# Patient Record
Sex: MALE | Race: WHITE | Employment: STUDENT | URBAN - METROPOLITAN AREA
[De-identification: names, ages, dates, MRNs, and addresses within clinical notes are randomized per-mention and may not be internally consistent; named-entity substitution may affect disease eponyms.]

---

## 2018-09-19 ENCOUNTER — HOSPITAL ENCOUNTER (OUTPATIENT)
Dept: PHYSICAL THERAPY | Age: 18
Discharge: HOME | End: 2018-09-19
Payer: COMMERCIAL

## 2018-09-19 PROCEDURE — 97161 PT EVAL LOW COMPLEX 20 MIN: CPT

## 2018-09-19 NOTE — PROGRESS NOTES
Ambulatory/Rehab Services H2 Model Falls Risk Assessment    Risk Factor Pts. ·   Confusion/Disorientation/Impulsivity  []    4 ·   Symptomatic Depression  []   2 ·   Altered Elimination  []   1 ·   Dizziness/Vertigo  []   1 ·   Gender (Male)  [x]   1 ·   Any administered antiepileptics (anticonvulsants):  []   2 ·   Any administered benzodiazepines:  []   1 ·   Visual Impairment (specify):  []   1 ·   Portable Oxygen Use  []   1 ·   Orthostatic ? BP  []   1 ·   History of Recent Falls (within 3 mos.)  []   5     Ability to Rise from Chair (choose one) Pts. ·   Ability to rise in a single movement  [x]   0 ·   Pushes up, successful in one attempt  []   1 ·   Multiple attempts, but successful  []   3 ·   Unable to rise without assistance  []   4   Total: (5 or greater = High Risk) 1     Falls Prevention Plan:   []                Physical Limitations to Exercise (specify):   []                Mobility Assistance Device (type):   []                Exercise/Equipment Adaptation (specify):    ©2010 [facility] of [address] Patent #[id number].  Federal Law prohibits the replication, distribution or use without written permission from [facility] [employer]

## 2018-09-19 NOTE — THERAPY EVALUATION
Arron Rosario  : 2000  Primary: Divya Simmons Of [facility]*  Secondary:  Therapy Center at [address]  [phone]   [phone]        OUTPATIENT PHYSICAL THERAPY:Initial Assessment and Daily Note 2018   ICD-10: Treatment Diagnosis: Low back Pain [M54.5]; Cervicalgia M54.2]  Precautions/Allergies:   Amoxicillin   Fall Risk Score: 1 (? 5 = High Risk)  MD Orders: Evaluate and Treat MEDICAL/REFERRING DIAGNOSIS:  Low back pain [M54.5]   DATE OF ONSET: 2018  REFERRING PHYSICIAN: Jeannie Begum PA  RETURN PHYSICIAN APPOINTMENT: TBD     INITIAL ASSESSMENT:  Mr. Nasreen Neri presents with signs and symptoms of low back pain. Per the treatment based classification system for low back pain, pt. Is most closely associated with the stabilization/motor control category. Pt. Will benefit from core, gluteal, and lumbar strengthening exercises to address chronic back pain and return to previous level of ADL performance. Pt. Additionally c/o chronic neck pain, which has not bothered him recently. PROBLEM LIST (Impacting functional limitations):  1. Decreased Strength  2. Decreased ADL/Functional Activities  3. Increased Pain  4. Decreased Flexibility/Joint Mobility  5. Decreased [place] with Home Exercise Program INTERVENTIONS PLANNED:  1. Home Exercise Program (HEP)  2. Manual Therapy  3. Range of Motion (ROM)  4. Therapeutic Exercise/Strengthening   TREATMENT PLAN:  Effective Dates: 2018 TO 2018 (90 days). Frequency/Duration: 2 times a week for 90 Days  GOALS: (Goals have been discussed and agreed upon with patient.)  Discharge Goals: Time Frame: 8 weeks  1. Pt. Will demonstrate >90 degrees of lumbar flexion without pain to improve functional ROM. 2. Pt. Will sit for 30 minutes with < 2/10 LBP to address pain complaints. 3. Pt. Will score <10 % on the THEE to demonstrate improved pain and function.    Rehabilitation Potential For Stated Goals: Good              The information in this section was collected on 9/19/18 (except where otherwise noted). HISTORY:   History of Present Injury/Illness (Reason for Referral):  Pt. Attends PT c/o LBP with onset at the beginning of the year after starting a new job that required repetitive lifting. Pt. Notes the pain gradually increased over six months he worked the job until he decided to change jobs. Pt. Notes the pain is aggravated with prolonged sitting and lifting activities. The pain is improved with standing and walking. Pt. Denies radicular symptoms. Pt. Notes treatment by chiropractor that has not changed the pain. Past Medical History/Comorbidities:   Mr. Mar Bowens  has a past medical history of Asthma. Mr. Mar Bowens  has no past surgical history on file. Social History/Living Environment:     Lives in a two story home with family  Prior Level of Function/Work/Activity:  Functioned independently without limitations. Current Medications:       Current Outpatient Prescriptions:     albuterol (PROVENTIL VENTOLIN) 2.5 mg /3 mL (0.083 %) nebulizer solution, by Nebulization route once., Disp: , Rfl:     cetirizine (ZYRTEC) 10 mg tablet, Take 1 Tab by mouth daily. , Disp: 30 Tab, Rfl: 2   Date Last Reviewed:  9/19/2018   Number of Personal Factors/Comorbidities that affect the Plan of Care: 0: LOW COMPLEXITY   EXAMINATION:   OBSERVATION: increased thoracic kyphosis    Palpation/Joint Mobility:   Left Right   Lumbar Paraspinals Mild tenderness Mild tenderness   Quadratus Lumborum -- --   Gluteals -- --   Thoracic Spine hypomobility hypomobility   Lumbar Spine normal normal     Range of Motion: Lumbar degrees  Flexion 80 (pain)  Repeated Movement   Extension 40  Flex: --   Side Bend L:30 R: 30 Ext: --       Strength:   Left Right   Hip Extensors 4/5 4/5   Hip Abductors 4/5 5/5   Functional           Other:       Flexibility: Neuroscreen:  Quadriceps  -- Reflexes Sensation   Iliopsoas -- L2-4: 1+    Hamstrings limited S1-2: 2+    Gluteals -- Babinski: --    Triceps Surae --       Special Testing:   Left Right   Straight Leg Raise (-) (-)   Slump (-) (-)   Femoral Nerve      Aberrant Movements (-) (-)   Prone Instability Test (-) (-)   FADIR (-) (-)   LETY Stiles       SFMA: Multisegmental Lumbar Flexion DP, Multisegmental Extension DNP; Deep Squat DNP; Multisegmental rotation DNP, Cervical flexion FP; the rest FNP   Body Structures Involved:  1. Joints  2. Muscles Body Functions Affected:  1. Neuromusculoskeletal  2. Movement Related Activities and Participation Affected:  1. General Tasks and Demands  2. Mobility  3. Community, Social and [place]   Number of elements (examined above) that affect the Plan of Care: 1-2: LOW COMPLEXITY   CLINICAL PRESENTATION:   Presentation: Stable and uncomplicated: LOW COMPLEXITY   CLINICAL DECISION MAKING:   Outcome Measure: Tool Used: Modified Oswestry Low Back Pain Questionnaire  Score:  Initial: 9/50  Most Recent: X/50 (Date: -- )   Interpretation of Score: Each section is scored on a 0-5 scale, 5 representing the greatest disability. The scores of each section are added together for a total score of 50. Medical Necessity:   · Patient is expected to demonstrate progress in strength and range of motion to increase independence with sitting, driving and physical activity. Reason for Services/Other Comments:  · Pt. will benefit from skilled PT to address impairments identified through evaluation and return to previous level of ADL. Mariza Wynn    Use of outcome tool(s) and clinical judgement create a POC that gives a: Clear prediction of patient's progress: LOW COMPLEXITY            TREATMENT:   (In addition to Assessment/Re-Assessment sessions the following treatments were rendered)  Pre-treatment Symptoms/Complaints:  Low back pain  Pain: Initial:   Pain Intensity 1: 7 /10 Post Session:  6/10     No treatments was provided today, just evaluation. Pt. Is provided initial HEP. Date:  9/19/2018   Activity/Exercise Parameters   -- --                               [employer] Portal  Treatment/Session Assessment:    · Response to Treatment: TBD  · Compliance with Program/Exercises: Will assess as treatment progresses. · Recommendations/Intent for next treatment session: \"Next visit will focus on advancements to more challenging activities\".   Total Treatment Duration: 60 minutes evaluation  PT Patient Time In/Time Out  Time In: 0930  Time Out: [address] [place] [address] SUE Ramirez

## 2018-09-26 ENCOUNTER — HOSPITAL ENCOUNTER (OUTPATIENT)
Dept: PHYSICAL THERAPY | Age: 18
Discharge: HOME | End: 2018-09-26
Payer: COMMERCIAL

## 2018-09-26 PROCEDURE — 97110 THERAPEUTIC EXERCISES: CPT

## 2018-09-26 PROCEDURE — 97140 MANUAL THERAPY 1/> REGIONS: CPT

## 2018-09-26 NOTE — PROGRESS NOTES
Maria Guadalupe Hidden  : 2000  Primary: Saeed Litten Of [facility]*  Secondary:  Therapy Center at [address]  [phone]   [phone]        OUTPATIENT PHYSICAL THERAPY:Daily Note 2018   ICD-10: Treatment Diagnosis: Low back Pain [M54.5]; Cervicalgia M54.2]  Precautions/Allergies:   Amoxicillin   Fall Risk Score: 1 (? 5 = High Risk)  MD Orders: Evaluate and Treat MEDICAL/REFERRING DIAGNOSIS:  Low back pain [M54.5]   DATE OF ONSET: 2018  REFERRING PHYSICIAN: Jeannie Begum PA  RETURN PHYSICIAN APPOINTMENT: TBD     INITIAL ASSESSMENT:  Mr. Arely Wei presents with signs and symptoms of low back pain. Per the treatment based classification system for low back pain, pt. Is most closely associated with the stabilization/motor control category. Pt. Will benefit from core, gluteal, and lumbar strengthening exercises to address chronic back pain and return to previous level of ADL performance. Pt. Additionally c/o chronic neck pain, which has not bothered him recently. PROBLEM LIST (Impacting functional limitations):  1. Decreased Strength  2. Decreased ADL/Functional Activities  3. Increased Pain  4. Decreased Flexibility/Joint Mobility  5. Decreased [place] with Home Exercise Program INTERVENTIONS PLANNED:  1. Home Exercise Program (HEP)  2. Manual Therapy  3. Range of Motion (ROM)  4. Therapeutic Exercise/Strengthening   TREATMENT PLAN:  Effective Dates: 2018 TO 2018 (90 days). Frequency/Duration: 2 times a week for 90 Days  GOALS: (Goals have been discussed and agreed upon with patient.)  Discharge Goals: Time Frame: 8 weeks  1. Pt. Will demonstrate >90 degrees of lumbar flexion without pain to improve functional ROM. 2. Pt. Will sit for 30 minutes with < 2/10 LBP to address pain complaints. 3. Pt. Will score <10 % on the THEE to demonstrate improved pain and function.    Rehabilitation Potential For Stated Goals: Good              The information in this section was collected on 9/19/18 (except where otherwise noted). HISTORY:   History of Present Injury/Illness (Reason for Referral):  Pt. Attends PT c/o LBP with onset at the beginning of the year after starting a new job that required repetitive lifting. Pt. Notes the pain gradually increased over six months he worked the job until he decided to change jobs. Pt. Notes the pain is aggravated with prolonged sitting and lifting activities. The pain is improved with standing and walking. Pt. Denies radicular symptoms. Pt. Notes treatment by chiropractor that has not changed the pain. Past Medical History/Comorbidities:   Mr. Vivian Chaudhry  has a past medical history of Asthma. Mr. Vivian Chaudhry  has no past surgical history on file. Social History/Living Environment:     Lives in a two story home with family  Prior Level of Function/Work/Activity:  Functioned independently without limitations. Current Medications:       Current Outpatient Prescriptions:     albuterol (PROVENTIL VENTOLIN) 2.5 mg /3 mL (0.083 %) nebulizer solution, by Nebulization route once., Disp: , Rfl:     cetirizine (ZYRTEC) 10 mg tablet, Take 1 Tab by mouth daily. , Disp: 30 Tab, Rfl: 2   Date Last Reviewed:  9/26/2018   Number of Personal Factors/Comorbidities that affect the Plan of Care: 0: LOW COMPLEXITY   EXAMINATION:   OBSERVATION: increased thoracic kyphosis    Palpation/Joint Mobility:   Left Right   Lumbar Paraspinals Mild tenderness Mild tenderness   Quadratus Lumborum -- --   Gluteals -- --   Thoracic Spine hypomobility hypomobility   Lumbar Spine normal normal     Range of Motion: Lumbar degrees  Flexion 80 (pain)  Repeated Movement   Extension 40  Flex: --   Side Bend L:30 R: 30 Ext: --       Strength:   Left Right   Hip Extensors 4/5 4/5   Hip Abductors 4/5 5/5   Functional           Other:       Flexibility: Neuroscreen:  Quadriceps  -- Reflexes Sensation   Iliopsoas -- L2-4: 1+    Hamstrings limited S1-2: 2+    Gluteals -- Babinski: --    Triceps Surae --       Special Testing:   Left Right   Straight Leg Raise (-) (-)   Slump (-) (-)   Femoral Nerve      Aberrant Movements (-) (-)   Prone Instability Test (-) (-)   FADIR (-) (-)   LETY Stiles       SFMA: Multisegmental Lumbar Flexion DP, Multisegmental Extension DNP; Deep Squat DNP; Multisegmental rotation DNP, Cervical flexion FP; the rest FNP   Body Structures Involved:  1. Joints  2. Muscles Body Functions Affected:  1. Neuromusculoskeletal  2. Movement Related Activities and Participation Affected:  1. General Tasks and Demands  2. Mobility  3. Community, Social and [place]   Number of elements (examined above) that affect the Plan of Care: 1-2: LOW COMPLEXITY   CLINICAL PRESENTATION:   Presentation: Stable and uncomplicated: LOW COMPLEXITY   CLINICAL DECISION MAKING:   Outcome Measure: Tool Used: Modified Oswestry Low Back Pain Questionnaire  Score:  Initial: 9/50  Most Recent: X/50 (Date: -- )   Interpretation of Score: Each section is scored on a 0-5 scale, 5 representing the greatest disability. The scores of each section are added together for a total score of 50. Medical Necessity:   · Patient is expected to demonstrate progress in strength and range of motion to increase independence with sitting, driving and physical activity. Reason for Services/Other Comments:  · Pt. will benefit from skilled PT to address impairments identified through evaluation and return to previous level of ADL. Katherene Means Use of outcome tool(s) and clinical judgement create a POC that gives a: Clear prediction of patient's progress: LOW COMPLEXITY            TREATMENT:   (In addition to Assessment/Re-Assessment sessions the following treatments were rendered)  Pre-treatment Symptoms/Complaints:  Pt. Notes improved LBP over the past week and good compliance with HEP. Pain: Initial:   Pain Intensity 1: 5 /10 Post Session:  6/10     Therapeutic Exercise: (50 Minutes):  Exercises per grid below to improve mobility and strength. Required minimal verbal and manual cues to promote proper body alignment and promote proper body mechanics. Progressed repetitions as indicated. Date:  9/26/2018   Activity/Exercise Parameters   Rolling Pattern: supine-prone 8 minutes   Bird Dogs 3 x 20   Bridge progression 5 minutes   clams 4 minutes   Sidelying hip abduction 3 x 10   Half dead Bug (LE only) 6 minutes   Half kneeling with chops 8 minutes   Lateral walking (red band) 4 minutes   Push up with core control 3 x 10   Manual Therapy (    Soft Tissue Mobilization Duration  Duration: 10 Minutes): Manual techniques to facilitate improved motion and decreased pain. (Used abbreviations: MET - muscle energy technique; PNF - proprioceptive neuromuscular facilitation; NMR - neuromuscular re-education; a/p - anterior to posterior; p/a - posterior to anterior)   · Soft tissue mobilization: B hamstrings. [facility] Portal  Treatment/Session Assessment:    · Response to Treatment: Pt. Demonstrates improved multisegmental lumbar flexion today without increased hamstring tightness. Pt. Is initiated on core, gluteal, and hip strengthening exercises without complaints. · Compliance with Program/Exercises: Will assess as treatment progresses. · Recommendations/Intent for next treatment session: \"Next visit will focus on advancements to more challenging activities\". Total Treatment Duration: 50 minutes therapeutic exercise, 10 minutes manual therapy.    PT Patient Time In/Time Out  Time In: 1130  Time Out: [facility] [facility]

## 2018-09-28 ENCOUNTER — APPOINTMENT (OUTPATIENT)
Dept: PHYSICAL THERAPY | Age: 18
End: 2018-09-28
Payer: COMMERCIAL

## 2018-10-01 ENCOUNTER — HOSPITAL ENCOUNTER (OUTPATIENT)
Dept: PHYSICAL THERAPY | Age: 18
Discharge: HOME | End: 2018-10-01
Payer: COMMERCIAL

## 2018-10-01 PROCEDURE — 97110 THERAPEUTIC EXERCISES: CPT

## 2018-10-01 NOTE — PROGRESS NOTES
Kris Hayes  : 2000  Primary: Eli Cali Of [facility]*  Secondary:  Therapy Center at [address], [address] [place] [address]  [phone]   [phone]        OUTPATIENT PHYSICAL THERAPY:Daily Note 10/1/2018   ICD-10: Treatment Diagnosis: Low back Pain [M54.5]; Cervicalgia M54.2]  Precautions/Allergies:   Amoxicillin   Fall Risk Score: 1 (? 5 = High Risk)  MD Orders: Evaluate and Treat MEDICAL/REFERRING DIAGNOSIS:  Low back pain [M54.5]   DATE OF ONSET: 2018  REFERRING PHYSICIAN: Jeannie Begum PA  RETURN PHYSICIAN APPOINTMENT: TBD     INITIAL ASSESSMENT:  Mr. Naz Whitt presents with signs and symptoms of low back pain. Per the treatment based classification system for low back pain, pt. Is most closely associated with the stabilization/motor control category. Pt. Will benefit from core, gluteal, and lumbar strengthening exercises to address chronic back pain and return to previous level of ADL performance. Pt. Additionally c/o chronic neck pain, which has not bothered him recently. PROBLEM LIST (Impacting functional limitations):  1. Decreased Strength  2. Decreased ADL/Functional Activities  3. Increased Pain  4. Decreased Flexibility/Joint Mobility  5. Decreased [place] with Home Exercise Program INTERVENTIONS PLANNED:  1. Home Exercise Program (HEP)  2. Manual Therapy  3. Range of Motion (ROM)  4. Therapeutic Exercise/Strengthening   TREATMENT PLAN:  Effective Dates: 2018 TO 2018 (90 days). Frequency/Duration: 2 times a week for 90 Days  GOALS: (Goals have been discussed and agreed upon with patient.)  Discharge Goals: Time Frame: 8 weeks  1. Pt. Will demonstrate >90 degrees of lumbar flexion without pain to improve functional ROM. 2. Pt. Will sit for 30 minutes with < 2/10 LBP to address pain complaints. 3. Pt. Will score <10 % on the THEE to demonstrate improved pain and function.    Rehabilitation Potential For Stated Goals: Good              The information in this section was collected on 9/19/18 (except where otherwise noted). HISTORY:   History of Present Injury/Illness (Reason for Referral):  Pt. Attends PT c/o LBP with onset at the beginning of the year after starting a new job that required repetitive lifting. Pt. Notes the pain gradually increased over six months he worked the job until he decided to change jobs. Pt. Notes the pain is aggravated with prolonged sitting and lifting activities. The pain is improved with standing and walking. Pt. Denies radicular symptoms. Pt. Notes treatment by chiropractor that has not changed the pain. Past Medical History/Comorbidities:   Mr. Margie Cardozo  has a past medical history of Asthma. Mr. Margie Cardozo  has no past surgical history on file. Social History/Living Environment:     Lives in a two story home with family  Prior Level of Function/Work/Activity:  Functioned independently without limitations. Current Medications:       Current Outpatient Prescriptions:     albuterol (PROVENTIL VENTOLIN) 2.5 mg /3 mL (0.083 %) nebulizer solution, by Nebulization route once., Disp: , Rfl:     cetirizine (ZYRTEC) 10 mg tablet, Take 1 Tab by mouth daily. , Disp: 30 Tab, Rfl: 2   Date Last Reviewed:  10/1/2018   Number of Personal Factors/Comorbidities that affect the Plan of Care: 0: LOW COMPLEXITY   EXAMINATION:   OBSERVATION: increased thoracic kyphosis    Palpation/Joint Mobility:   Left Right   Lumbar Paraspinals Mild tenderness Mild tenderness   Quadratus Lumborum -- --   Gluteals -- --   Thoracic Spine hypomobility hypomobility   Lumbar Spine normal normal     Range of Motion: Lumbar degrees  Flexion 80 (pain)  Repeated Movement   Extension 40  Flex: --   Side Bend L:30 R: 30 Ext: --       Strength:   Left Right   Hip Extensors 4/5 4/5   Hip Abductors 4/5 5/5   Functional           Other:       Flexibility: Neuroscreen:  Quadriceps  -- Reflexes Sensation   Iliopsoas -- L2-4: 1+    Hamstrings limited S1-2: 2+    Gluteals -- Babinski: --    Triceps Surae --       Special Testing:   Left Right   Straight Leg Raise (-) (-)   Slump (-) (-)   Femoral Nerve      Aberrant Movements (-) (-)   Prone Instability Test (-) (-)   FADIR (-) (-)   LETY Stiles       SFMA: Multisegmental Lumbar Flexion DP, Multisegmental Extension DNP; Deep Squat DNP; Multisegmental rotation DNP, Cervical flexion FP; the rest FNP   Body Structures Involved:  1. Joints  2. Muscles Body Functions Affected:  1. Neuromusculoskeletal  2. Movement Related Activities and Participation Affected:  1. General Tasks and Demands  2. Mobility  3. Community, Social and [address]   Number of elements (examined above) that affect the Plan of Care: 1-2: LOW COMPLEXITY   CLINICAL PRESENTATION:   Presentation: Stable and uncomplicated: LOW COMPLEXITY   CLINICAL DECISION MAKING:   Outcome Measure: Tool Used: Modified Oswestry Low Back Pain Questionnaire  Score:  Initial: 9/50  Most Recent: X/50 (Date: -- )   Interpretation of Score: Each section is scored on a 0-5 scale, 5 representing the greatest disability. The scores of each section are added together for a total score of 50. Medical Necessity:   · Patient is expected to demonstrate progress in strength and range of motion to increase independence with sitting, driving and physical activity. Reason for Services/Other Comments:  · Pt. will benefit from skilled PT to address impairments identified through evaluation and return to previous level of ADL. Murali Farley Use of outcome tool(s) and clinical judgement create a POC that gives a: Clear prediction of patient's progress: LOW COMPLEXITY            TREATMENT:   (In addition to Assessment/Re-Assessment sessions the following treatments were rendered)  Pre-treatment Symptoms/Complaints:  Pt. Reports absence of flare up of LBP.   Pt. Notes he was able to hike with a large back pack on for a quarter of a mile without LBP. Pain: Initial:   Pain Intensity 1: 4 /10 Post Session:  4/10     Therapeutic Exercise: (55 Minutes):  Exercises per grid below to improve mobility and strength. Required minimal verbal and manual cues to promote proper body alignment and promote proper body mechanics. Progressed repetitions as indicated. Date:  10/1/2018   Activity/Exercise Parameters   Rolling Pattern: supine-prone 8 minutes   Bird Dogs 3 x 20   Bridge progression --   clams 4 minutes   Sidelying hip abduction 3 x 10   Half dead Bug (LE only) 6 minutes   Half kneeling with chops 8 minutes   Lateral walking (red band) 4 minutes   Push up with core control 3 x 10   Deep squat 3 x 10   lunges 3 x 10   Side bridge for repetitions (knees bent) 3 x 10       [facility] Portal  Treatment/Session Assessment:    · Response to Treatment: Pt. Continues with motor control and strength progression demonstrating improved core and gluteal control. Mild cueing remains for appropriate rolling patterns. Pt. Will benefit from continued progression of current program.    · Compliance with Program/Exercises: good compliance. · Recommendations/Intent for next treatment session: \"Next visit will focus on advancements to more challenging activities\".   Total Treatment Duration: 55 minutes therapeutic exercise   PT Patient Time In/Time Out  Time In: 1130  Time Out: [address] Mike [address]ke[address]

## 2018-10-08 ENCOUNTER — HOSPITAL ENCOUNTER (OUTPATIENT)
Dept: PHYSICAL THERAPY | Age: 18
Discharge: HOME | End: 2018-10-08
Payer: COMMERCIAL

## 2018-10-08 PROCEDURE — 97110 THERAPEUTIC EXERCISES: CPT

## 2018-10-08 NOTE — PROGRESS NOTES
Sandip Staley  : 2000  Primary: [facility]*  Secondary:  Therapy Center at [address], [address] [place] [address]  [phone]   [phone]        OUTPATIENT PHYSICAL THERAPY:Daily Note 10/8/2018   ICD-10: Treatment Diagnosis: Low back Pain [M54.5]; Cervicalgia M54.2]  Precautions/Allergies:   Amoxicillin   Fall Risk Score: 1 (? 5 = High Risk)  MD Orders: Evaluate and Treat MEDICAL/REFERRING DIAGNOSIS:  Low back pain [M54.5]   DATE OF ONSET: 2018  REFERRING PHYSICIAN: Jeannie Begum PA  RETURN PHYSICIAN APPOINTMENT: TBD     INITIAL ASSESSMENT:  Mr. Giovanna Ro presents with signs and symptoms of low back pain. Per the treatment based classification system for low back pain, pt. Is most closely associated with the stabilization/motor control category. Pt. Will benefit from core, gluteal, and lumbar strengthening exercises to address chronic back pain and return to previous level of ADL performance. Pt. Additionally c/o chronic neck pain, which has not bothered him recently. PROBLEM LIST (Impacting functional limitations):  1. Decreased Strength  2. Decreased ADL/Functional Activities  3. Increased Pain  4. Decreased Flexibility/Joint Mobility  5. Decreased [place] with Home Exercise Program INTERVENTIONS PLANNED:  1. Home Exercise Program (HEP)  2. Manual Therapy  3. Range of Motion (ROM)  4. Therapeutic Exercise/Strengthening   TREATMENT PLAN:  Effective Dates: 2018 TO 2018 (90 days). Frequency/Duration: 2 times a week for 90 Days  GOALS: (Goals have been discussed and agreed upon with patient.)  Discharge Goals: Time Frame: 8 weeks  1. Pt. Will demonstrate >90 degrees of lumbar flexion without pain to improve functional ROM. 2. Pt. Will sit for 30 minutes with < 2/10 LBP to address pain complaints. 3. Pt. Will score <10 % on the THEE to demonstrate improved pain and function.    Rehabilitation Potential For Stated Goals: Good              The information in this section was collected on 9/19/18 (except where otherwise noted). HISTORY:   History of Present Injury/Illness (Reason for Referral):  Pt. Attends PT c/o LBP with onset at the beginning of the year after starting a new job that required repetitive lifting. Pt. Notes the pain gradually increased over six months he worked the job until he decided to change jobs. Pt. Notes the pain is aggravated with prolonged sitting and lifting activities. The pain is improved with standing and walking. Pt. Denies radicular symptoms. Pt. Notes treatment by chiropractor that has not changed the pain. Past Medical History/Comorbidities:   Mr. Jurgen Walls  has a past medical history of Asthma. Mr. Jurgen Walls  has no past surgical history on file. Social History/Living Environment:     Lives in a two story home with family  Prior Level of Function/Work/Activity:  Functioned independently without limitations. Current Medications:       Current Outpatient Prescriptions:     albuterol (PROVENTIL VENTOLIN) 2.5 mg /3 mL (0.083 %) nebulizer solution, by Nebulization route once., Disp: , Rfl:     cetirizine (ZYRTEC) 10 mg tablet, Take 1 Tab by mouth daily. , Disp: 30 Tab, Rfl: 2   Date Last Reviewed:  10/8/2018   Number of Personal Factors/Comorbidities that affect the Plan of Care: 0: LOW COMPLEXITY   EXAMINATION:   OBSERVATION: increased thoracic kyphosis    Palpation/Joint Mobility:   Left Right   Lumbar Paraspinals Mild tenderness Mild tenderness   Quadratus Lumborum -- --   Gluteals -- --   Thoracic Spine hypomobility hypomobility   Lumbar Spine normal normal     Range of Motion: Lumbar degrees  Flexion 80 (pain)  Repeated Movement   Extension 40  Flex: --   Side Bend L:30 R: 30 Ext: --       Strength:   Left Right   Hip Extensors 4/5 4/5   Hip Abductors 4/5 5/5   Functional           Other:       Flexibility: Neuroscreen:  Quadriceps  -- Reflexes Sensation   Iliopsoas -- L2-4: 1+    Hamstrings limited S1-2: 2+    Gluteals -- Babinski: --    Triceps Surae --       Special Testing:   Left Right   Straight Leg Raise (-) (-)   Slump (-) (-)   Femoral Nerve      Aberrant Movements (-) (-)   Prone Instability Test (-) (-)   FADIR (-) (-)   LETY Stiles       SFMA: Multisegmental Lumbar Flexion DP, Multisegmental Extension DNP; Deep Squat DNP; Multisegmental rotation DNP, Cervical flexion FP; the rest FNP   Body Structures Involved:  1. Joints  2. Muscles Body Functions Affected:  1. Neuromusculoskeletal  2. Movement Related Activities and Participation Affected:  1. General Tasks and Demands  2. Mobility  3. Community, Social and [place]   Number of elements (examined above) that affect the Plan of Care: 1-2: LOW COMPLEXITY   CLINICAL PRESENTATION:   Presentation: Stable and uncomplicated: LOW COMPLEXITY   CLINICAL DECISION MAKING:   Outcome Measure: Tool Used: Modified Oswestry Low Back Pain Questionnaire  Score:  Initial: 9/50  Most Recent: X/50 (Date: -- )   Interpretation of Score: Each section is scored on a 0-5 scale, 5 representing the greatest disability. The scores of each section are added together for a total score of 50. Medical Necessity:   · Patient is expected to demonstrate progress in strength and range of motion to increase independence with sitting, driving and physical activity. Reason for Services/Other Comments:  · Pt. will benefit from skilled PT to address impairments identified through evaluation and return to previous level of ADL. Wendy Tripathi Use of outcome tool(s) and clinical judgement create a POC that gives a: Clear prediction of patient's progress: LOW COMPLEXITY            TREATMENT:   (In addition to Assessment/Re-Assessment sessions the following treatments were rendered)  Pre-treatment Symptoms/Complaints:  Pt. Notes he was able to return to the gym last week.   Pain remains minimal. Pain: Initial:   Pain Intensity 1: 3 /10 Post Session:  3/10     Therapeutic Exercise: (55 Minutes):  Exercises per grid below to improve mobility and strength. Required minimal verbal and manual cues to promote proper body alignment and promote proper body mechanics. Progressed repetitions as indicated. Date:  10/8/2018   Activity/Exercise Parameters   Rolling Pattern: supine-prone 6 minutes   Bird Dogs on BOSU 3 x 20   Bridge progression 5 minutes   clams --   Sidelying hip abduction 3 x 10   Dead Bug  6 minutes   Half kneeling with chops --   Lateral walking (red band) 4 minutes   Push up with core control 3 x 10   Deep squat 3 x 10   lunges --   Side bridge for repetitions (knees bent) 3 x 10   Curls ups on stability ball 3 x 20   Single leg squat 3 x 10   Manual Therapy (    Soft Tissue Mobilization Duration  Duration: 5 Minutes): Manual techniques to facilitate improved motion and decreased pain. (Used abbreviations: MET - muscle energy technique; PNF - proprioceptive neuromuscular facilitation; NMR - neuromuscular re-education; a/p - anterior to posterior; p/a - posterior to anterior)   · Joint mobilization: thoracic manipulation in supine, grade V a/p      Med[employer] Portal  Treatment/Session Assessment:    · Response to Treatment: Pt. Requires minimal cueing for rolling patterns today and completes bird dogs without corrections indicating improved core and hip motor control. Pt. Will benefit from continued advancement of program.     · Compliance with Program/Exercises: good compliance. · Recommendations/Intent for next treatment session: \"Next visit will focus on advancements to more challenging activities\". Total Treatment Duration: 55 minutes therapeutic exercise, 5 minutes manual therapy, 60 minutes total time.     PT Patient Time In/Time Out  Time In: 1130  Time Out: [address] [facility]

## 2018-10-15 ENCOUNTER — HOSPITAL ENCOUNTER (OUTPATIENT)
Dept: PHYSICAL THERAPY | Age: 18
Discharge: HOME | End: 2018-10-15
Payer: COMMERCIAL

## 2018-10-15 PROCEDURE — 97110 THERAPEUTIC EXERCISES: CPT

## 2018-10-15 NOTE — PROGRESS NOTES
Jessie Krishnamurthy  : 2000  Primary: [facility]*  Secondary:  Therapy Center at [address]  [address] [place] [address], [address] [place] [address]  [phone]   [phone]        OUTPATIENT PHYSICAL THERAPY:Daily Note 10/15/2018   ICD-10: Treatment Diagnosis: Low back Pain [M54.5]; Cervicalgia M54.2]  Precautions/Allergies:   Amoxicillin   Fall Risk Score: 1 (? 5 = High Risk)  MD Orders: Evaluate and Treat MEDICAL/REFERRING DIAGNOSIS:  Low back pain [M54.5]   DATE OF ONSET: 2018  REFERRING PHYSICIAN: Jeannie Begum PA  RETURN PHYSICIAN APPOINTMENT: TBD     INITIAL ASSESSMENT:  Mr. Petersen Nurse presents with signs and symptoms of low back pain. Per the treatment based classification system for low back pain, pt. Is most closely associated with the stabilization/motor control category. Pt. Will benefit from core, gluteal, and lumbar strengthening exercises to address chronic back pain and return to previous level of ADL performance. Pt. Additionally c/o chronic neck pain, which has not bothered him recently. PROBLEM LIST (Impacting functional limitations):  1. Decreased Strength  2. Decreased ADL/Functional Activities  3. Increased Pain  4. Decreased Flexibility/Joint Mobility  5. Decreased [place] with Home Exercise Program INTERVENTIONS PLANNED:  1. Home Exercise Program (HEP)  2. Manual Therapy  3. Range of Motion (ROM)  4. Therapeutic Exercise/Strengthening   TREATMENT PLAN:  Effective Dates: 2018 TO 2018 (90 days). Frequency/Duration: 2 times a week for 90 Days  GOALS: (Goals have been discussed and agreed upon with patient.)  Discharge Goals: Time Frame: 8 weeks  1. Pt. Will demonstrate >90 degrees of lumbar flexion without pain to improve functional ROM. 2. Pt. Will sit for 30 minutes with < 2/10 LBP to address pain complaints. 3. Pt. Will score <10 % on the THEE to demonstrate improved pain and function.    Rehabilitation Potential For Stated Goals: Good              The information in this section was collected on 9/19/18 (except where otherwise noted). HISTORY:   History of Present Injury/Illness (Reason for Referral):  Pt. Attends PT c/o LBP with onset at the beginning of the year after starting a new job that required repetitive lifting. Pt. Notes the pain gradually increased over six months he worked the job until he decided to change jobs. Pt. Notes the pain is aggravated with prolonged sitting and lifting activities. The pain is improved with standing and walking. Pt. Denies radicular symptoms. Pt. Notes treatment by chiropractor that has not changed the pain. Past Medical History/Comorbidities:   Mr. Piper Gonzalez  has a past medical history of Asthma. Mr. Piper Gonzalez  has no past surgical history on file. Social History/Living Environment:     Lives in a two story home with family  Prior Level of Function/Work/Activity:  Functioned independently without limitations. Current Medications:       Current Outpatient Prescriptions:     albuterol (PROVENTIL VENTOLIN) 2.5 mg /3 mL (0.083 %) nebulizer solution, by Nebulization route once., Disp: , Rfl:     cetirizine (ZYRTEC) 10 mg tablet, Take 1 Tab by mouth daily. , Disp: 30 Tab, Rfl: 2   Date Last Reviewed:  10/15/2018   Number of Personal Factors/Comorbidities that affect the Plan of Care: 0: LOW COMPLEXITY   EXAMINATION:   OBSERVATION: increased thoracic kyphosis    Palpation/Joint Mobility:   Left Right   Lumbar Paraspinals Mild tenderness Mild tenderness   Quadratus Lumborum -- --   Gluteals -- --   Thoracic Spine hypomobility hypomobility   Lumbar Spine normal normal     Range of Motion: Lumbar degrees  Flexion 80 (pain)  Repeated Movement   Extension 40  Flex: --   Side Bend L:30 R: 30 Ext: --       Strength:   Left Right   Hip Extensors 4/5 4/5   Hip Abductors 4/5 5/5   Functional           Other:       Flexibility: Neuroscreen:  Quadriceps  -- Reflexes Sensation   Iliopsoas -- L2-4: 1+    Hamstrings limited S1-2: 2+    Gluteals -- Babinski: --    Triceps Surae --       Special Testing:   Left Right   Straight Leg Raise (-) (-)   Slump (-) (-)   Femoral Nerve      Aberrant Movements (-) (-)   Prone Instability Test (-) (-)   FADIR (-) (-)   LETY Stiles       SFMA: Multisegmental Lumbar Flexion DP, Multisegmental Extension DNP; Deep Squat DNP; Multisegmental rotation DNP, Cervical flexion FP; the rest FNP   Body Structures Involved:  1. Joints  2. Muscles Body Functions Affected:  1. Neuromusculoskeletal  2. Movement Related Activities and Participation Affected:  1. General Tasks and Demands  2. Mobility  3. Community, Social and [facility] [place]   Number of elements (examined above) that affect the Plan of Care: 1-2: LOW COMPLEXITY   CLINICAL PRESENTATION:   Presentation: Stable and uncomplicated: LOW COMPLEXITY   CLINICAL DECISION MAKING:   Outcome Measure: Tool Used: Modified Oswestry Low Back Pain Questionnaire  Score:  Initial: 9/50  Most Recent: X/50 (Date: -- )   Interpretation of Score: Each section is scored on a 0-5 scale, 5 representing the greatest disability. The scores of each section are added together for a total score of 50. Medical Necessity:   · Patient is expected to demonstrate progress in strength and range of motion to increase independence with sitting, driving and physical activity. Reason for Services/Other Comments:  · Pt. will benefit from skilled PT to address impairments identified through evaluation and return to previous level of ADL. Ed Quinteroing Use of outcome tool(s) and clinical judgement create a POC that gives a: Clear prediction of patient's progress: LOW COMPLEXITY            TREATMENT:   (In addition to Assessment/Re-Assessment sessions the following treatments were rendered)  Pre-treatment Symptoms/Complaints:  Pt. Reports absence of low back pain last week until this AM upon rising. Pt. Notes mild tightness across the lumbar region. Pain: Initial:   Pain Intensity 1: 3 /10 Post Session:  3/10     Therapeutic Exercise: (55 Minutes):  Exercises per grid below to improve mobility and strength. Required minimal verbal and manual cues to promote proper body alignment and promote proper body mechanics. Progressed repetitions as indicated. Date:  10/15/2018   Activity/Exercise Parameters   Rolling Pattern: supine-prone 6 minutes   Bird Dogs on BOSU 3 x 20   Bridge progression 5 minutes   clams 4 minutes   Sidelying hip abduction 3 x 10   Dead Bug  6 minutes   Half kneeling with chops 3 x 15 (5#)   Lateral walking (red band) 4 minutes   Push up with core control --   Deep squat (22#) 100 repetitions   lunges 3 x 10   Side bridge (knees bent) 2 minutes   Curls ups on stability ball 3 x 20   Single leg squat --   plank 2 minutes     [employer] [facility]  Treatment/Session Assessment:    · Response to Treatment: Pt. Continues to require cueing for appropriate core control during squats and plank exercise today. Overall, pt. Is making excellent progress with strength and motor control exercises. .     · Compliance with Program/Exercises: good compliance. · Recommendations/Intent for next treatment session: \"Next visit will focus on advancements to more challenging activities\". Total Treatment Duration: 55 minutes therapeutic exercise, 55 minutes total time.     PT Patient Time In/Time Out  Time In: 0930  Time Out: [address] [place] [address] SUE Ramirez

## 2018-10-17 ENCOUNTER — HOSPITAL ENCOUNTER (OUTPATIENT)
Dept: PHYSICAL THERAPY | Age: 18
Discharge: HOME | End: 2018-10-17
Payer: COMMERCIAL

## 2018-10-17 PROCEDURE — 97110 THERAPEUTIC EXERCISES: CPT

## 2018-10-17 NOTE — THERAPY DISCHARGE
Thor Reid  : 2000  Primary: [facility]*  Secondary:  Therapy Center at [address]  [phone]   [phone]        OUTPATIENT PHYSICAL THERAPY:Daily Note and Discharge 10/17/2018   ICD-10: Treatment Diagnosis: Low back Pain [M54.5]; Cervicalgia M54.2]  Precautions/Allergies:   Amoxicillin   Fall Risk Score: 1 (? 5 = High Risk)  MD Orders: Evaluate and Treat MEDICAL/REFERRING DIAGNOSIS:  Low back pain [M54.5]   DATE OF ONSET: 2018  REFERRING PHYSICIAN: Jeannie Begum PA  RETURN PHYSICIAN APPOINTMENT: TBD     INITIAL ASSESSMENT:  Mr. Ian Erickson presents with signs and symptoms of low back pain. Per the treatment based classification system for low back pain, pt. Is most closely associated with the stabilization/motor control category. Pt. Will benefit from core, gluteal, and lumbar strengthening exercises to address chronic back pain and return to previous level of ADL performance. Pt. Additionally c/o chronic neck pain, which has not bothered him recently. 10/17/18 Discharge Summary: Pt. Attends 6 physical therapy visits. Pt. Nathalie Shelter all set goals at this time and reports 0/10 LBP over the past two weeks. Core and gluteal strength remains limited but patient is independent with HEP to address remaining deficits. Pt. Will discharge at this time. PROBLEM LIST (Impacting functional limitations):  1. Decreased Strength  2. Decreased ADL/Functional Activities  3. Increased Pain  4. Decreased Flexibility/Joint Mobility  5. Decreased [place] with Home Exercise Program INTERVENTIONS PLANNED:  1. Home Exercise Program (HEP)  2. Manual Therapy  3. Range of Motion (ROM)  4. Therapeutic Exercise/Strengthening     GOALS: (Goals have been discussed and agreed upon with patient.)  Discharge Goals: Time Frame: 8 weeks  1. Pt. Will demonstrate >90 degrees of lumbar flexion without pain to improve functional ROM. MET  2. Pt. Will sit for 30 minutes with < 2/10 LBP to address pain complaints. MET  3. Pt. Will score <10 % on the THEE to demonstrate improved pain and function. MET   Rehabilitation Potential For Stated Goals: Good              The information in this section was collected on 9/19/18 (except where otherwise noted). HISTORY:   History of Present Injury/Illness (Reason for Referral):  Pt. Attends PT c/o LBP with onset at the beginning of the year after starting a new job that required repetitive lifting. Pt. Notes the pain gradually increased over six months he worked the job until he decided to change jobs. Pt. Notes the pain is aggravated with prolonged sitting and lifting activities. The pain is improved with standing and walking. Pt. Denies radicular symptoms. Pt. Notes treatment by chiropractor that has not changed the pain. Past Medical History/Comorbidities:   Mr. Sandra Jane  has a past medical history of Asthma. Mr. Sandra Jane  has no past surgical history on file. Social History/Living Environment:     Lives in a two story home with family  Prior Level of Function/Work/Activity:  Functioned independently without limitations. Current Medications:       Current Outpatient Medications:     albuterol (PROVENTIL VENTOLIN) 2.5 mg /3 mL (0.083 %) nebulizer solution, by Nebulization route once., Disp: , Rfl:     cetirizine (ZYRTEC) 10 mg tablet, Take 1 Tab by mouth daily. , Disp: 30 Tab, Rfl: 2   Date Last Reviewed:  10/17/2018   Number of Personal Factors/Comorbidities that affect the Plan of Care: 0: LOW COMPLEXITY   EXAMINATION:   OBSERVATION: increased thoracic kyphosis    Palpation/Joint Mobility:   Left Right   Lumbar Paraspinals -- --   Quadratus Lumborum -- --   Gluteals -- --   Thoracic Spine hypomobility hypomobility   Lumbar Spine normal normal     Range of Motion: Lumbar degrees  Flexion 95   Repeated Movement   Extension 40  Flex: --   Side Bend L:30 R: 30 Ext: --       Strength:   Left Right Hip Extensors 5/5 5/5   Hip Abductors 5/5 5/5   Functional           Other:       Flexibility:                                                                             Neuroscreen:  Quadriceps  -- Reflexes Sensation   Iliopsoas -- L2-4: 1+    Hamstrings limited S1-2: 2+    Gluteals -- Babinski: --    Triceps Surae --       Special Testing:   Left Right   Straight Leg Raise (-) (-)   Slump (-) (-)   Femoral Nerve      Aberrant Movements (-) (-)   Prone Instability Test (-) (-)   FADIR (-) (-)   LETY Stiles       SFMA: Multisegmental Lumbar Flexion DP, Multisegmental Extension DNP; Deep Squat DNP; Multisegmental rotation DNP, Cervical flexion FP; the rest FNP   Body Structures Involved:  1. Joints  2. Muscles Body Functions Affected:  1. Neuromusculoskeletal  2. Movement Related Activities and Participation Affected:  1. General Tasks and Demands  2. Mobility  3. Community, Social and [place]   Number of elements (examined above) that affect the Plan of Care: 1-2: LOW COMPLEXITY   CLINICAL PRESENTATION:   Presentation: Stable and uncomplicated: LOW COMPLEXITY   CLINICAL DECISION MAKING:   Outcome Measure: Tool Used: Modified Oswestry Low Back Pain Questionnaire  Score:  Initial: 9/50  Most Recent: 0/50 (Date: 10/17/18 )   Interpretation of Score: Each section is scored on a 0-5 scale, 5 representing the greatest disability. The scores of each section are added together for a total score of 50. Use of outcome tool(s) and clinical judgement create a POC that gives a: Clear prediction of patient's progress: LOW COMPLEXITY            TREATMENT:   (In addition to Assessment/Re-Assessment sessions the following treatments were rendered)  Pre-treatment Symptoms/Complaints:  Pt. Reports absence of low back pain last week until this AM upon rising. Pt. Notes mild tightness across the lumbar region.      Pain: Initial:   Pain Intensity 1: 0 /10 Post Session:  0/10     Therapeutic Exercise: (45 Minutes): Exercises per grid below to improve mobility and strength. Required minimal verbal and manual cues to promote proper body alignment and promote proper body mechanics. Progressed repetitions as indicated. Date:  10/17/2018   Activity/Exercise Parameters   Rolling Pattern: supine-prone --   Bird Dogs on BOSU 3 x 20   Bridge progression 5 minutes   clams --   Sidelying hip abduction    Dead Bug  6 minutes   Half kneeling with chops 3 x 15 (5#)   Lateral walking (red band) 4 minutes   Push up with core control 3 x 10   Deep squat (22#) --   lunges --   Side bridge (knees bent) 2 minutes   Bridges with core stabilization 3 x 15 (green band)   Hip Hinge (22#) 5 x 10   plank 2 minutes     Med[employer] Portal  Treatment/Session Assessment:    · Response to Treatment: Pt. Demonstrates appropriate psychomotor performance of HEP and demonstrates improved core control with therapeutic exercises today. Total Treatment Duration: 45 minutes therapeutic exercise, 45 minutes total time.     PT Patient Time In/Time Out  Time In: 0930  Time Out: [address] [place], PT